# Patient Record
Sex: MALE | Race: BLACK OR AFRICAN AMERICAN
[De-identification: names, ages, dates, MRNs, and addresses within clinical notes are randomized per-mention and may not be internally consistent; named-entity substitution may affect disease eponyms.]

---

## 2020-12-02 ENCOUNTER — HOSPITAL ENCOUNTER (EMERGENCY)
Dept: HOSPITAL 75 - ER | Age: 30
Discharge: HOME | End: 2020-12-02
Payer: SELF-PAY

## 2020-12-02 VITALS — HEIGHT: 72.99 IN | BODY MASS INDEX: 23.9 KG/M2 | WEIGHT: 180.34 LBS

## 2020-12-02 VITALS — DIASTOLIC BLOOD PRESSURE: 83 MMHG | SYSTOLIC BLOOD PRESSURE: 126 MMHG

## 2020-12-02 DIAGNOSIS — F17.200: ICD-10-CM

## 2020-12-02 DIAGNOSIS — S62.340A: Primary | ICD-10-CM
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
## 2020-12-02 PROCEDURE — 29125 APPL SHORT ARM SPLINT STATIC: CPT

## 2020-12-02 PROCEDURE — 73130 X-RAY EXAM OF HAND: CPT
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
## 2020-12-02 NOTE — DIAGNOSTIC IMAGING REPORT
EXAM: Right hand radiograph



EXAM DATE: 12/02/2020



COMPARISON: None.



HISTORY: Injury to the right hand with pain and swelling.



TECHNIQUE: 3 views of the right hand.



FINDINGS: There is cortical irregularity along the base of the

2nd metacarpal bone best seen on the AP image. No other acute

fracture, dislocation, or destructive osseous process. The joint

spaces are preserved. Soft tissue swelling about the right hand.



IMPRESSION: Cortical irregularity along the base of the 2nd

metacarpal could represent a nondisplaced fracture. Recommend

correlation with physical exam and followup radiograph in 14

days.



Dictated by: 



  Dictated on workstation # [id number]